# Patient Record
Sex: MALE | Race: AMERICAN INDIAN OR ALASKA NATIVE | NOT HISPANIC OR LATINO
[De-identification: names, ages, dates, MRNs, and addresses within clinical notes are randomized per-mention and may not be internally consistent; named-entity substitution may affect disease eponyms.]

---

## 2019-05-13 PROBLEM — Z00.00 ENCOUNTER FOR PREVENTIVE HEALTH EXAMINATION: Status: ACTIVE | Noted: 2019-05-13

## 2019-05-30 ENCOUNTER — RESULT REVIEW (OUTPATIENT)
Age: 64
End: 2019-05-30

## 2019-05-30 ENCOUNTER — OUTPATIENT (OUTPATIENT)
Dept: OUTPATIENT SERVICES | Facility: HOSPITAL | Age: 64
LOS: 1 days | Discharge: HOME | End: 2019-05-30

## 2019-05-30 LAB
GRAM STN FLD: SIGNIFICANT CHANGE UP
SPECIMEN SOURCE: SIGNIFICANT CHANGE UP

## 2019-06-01 LAB
-  AMPICILLIN/SULBACTAM: SIGNIFICANT CHANGE UP
-  AMPICILLIN/SULBACTAM: SIGNIFICANT CHANGE UP
-  AMPICILLIN: SIGNIFICANT CHANGE UP
-  AMPICILLIN: SIGNIFICANT CHANGE UP
-  CEFAZOLIN: SIGNIFICANT CHANGE UP
-  CEFAZOLIN: SIGNIFICANT CHANGE UP
-  CEFTRIAXONE: SIGNIFICANT CHANGE UP
-  CEFTRIAXONE: SIGNIFICANT CHANGE UP
-  GENTAMICIN: SIGNIFICANT CHANGE UP
-  GENTAMICIN: SIGNIFICANT CHANGE UP
-  PIPERACILLIN/TAZOBACTAM: SIGNIFICANT CHANGE UP
-  PIPERACILLIN/TAZOBACTAM: SIGNIFICANT CHANGE UP
-  TOBRAMYCIN: SIGNIFICANT CHANGE UP
-  TOBRAMYCIN: SIGNIFICANT CHANGE UP
-  TRIMETHOPRIM/SULFAMETHOXAZOLE: SIGNIFICANT CHANGE UP
-  TRIMETHOPRIM/SULFAMETHOXAZOLE: SIGNIFICANT CHANGE UP
CULTURE RESULTS: SIGNIFICANT CHANGE UP
METHOD TYPE: SIGNIFICANT CHANGE UP
METHOD TYPE: SIGNIFICANT CHANGE UP
ORGANISM # SPEC MICROSCOPIC CNT: SIGNIFICANT CHANGE UP
SPECIMEN SOURCE: SIGNIFICANT CHANGE UP

## 2019-06-10 LAB — SURGICAL PATHOLOGY STUDY: SIGNIFICANT CHANGE UP

## 2019-06-11 DIAGNOSIS — J33.8 OTHER POLYP OF SINUS: ICD-10-CM

## 2019-06-11 DIAGNOSIS — J32.4 CHRONIC PANSINUSITIS: ICD-10-CM

## 2019-06-11 DIAGNOSIS — J34.2 DEVIATED NASAL SEPTUM: ICD-10-CM

## 2019-06-11 DIAGNOSIS — E78.5 HYPERLIPIDEMIA, UNSPECIFIED: ICD-10-CM

## 2019-06-11 DIAGNOSIS — J34.89 OTHER SPECIFIED DISORDERS OF NOSE AND NASAL SINUSES: ICD-10-CM

## 2019-06-11 DIAGNOSIS — G47.33 OBSTRUCTIVE SLEEP APNEA (ADULT) (PEDIATRIC): ICD-10-CM

## 2019-06-11 DIAGNOSIS — Z87.891 PERSONAL HISTORY OF NICOTINE DEPENDENCE: ICD-10-CM

## 2019-06-11 DIAGNOSIS — I25.10 ATHEROSCLEROTIC HEART DISEASE OF NATIVE CORONARY ARTERY WITHOUT ANGINA PECTORIS: ICD-10-CM

## 2019-06-11 DIAGNOSIS — I10 ESSENTIAL (PRIMARY) HYPERTENSION: ICD-10-CM

## 2019-06-11 DIAGNOSIS — J44.9 CHRONIC OBSTRUCTIVE PULMONARY DISEASE, UNSPECIFIED: ICD-10-CM

## 2019-06-11 DIAGNOSIS — Z79.82 LONG TERM (CURRENT) USE OF ASPIRIN: ICD-10-CM

## 2019-06-11 DIAGNOSIS — J34.3 HYPERTROPHY OF NASAL TURBINATES: ICD-10-CM

## 2019-06-11 DIAGNOSIS — Z86.73 PERSONAL HISTORY OF TRANSIENT ISCHEMIC ATTACK (TIA), AND CEREBRAL INFARCTION WITHOUT RESIDUAL DEFICITS: ICD-10-CM

## 2019-12-04 ENCOUNTER — INPATIENT (INPATIENT)
Facility: HOSPITAL | Age: 64
LOS: 1 days | Discharge: HOME | DRG: 313 | End: 2019-12-06
Attending: STUDENT IN AN ORGANIZED HEALTH CARE EDUCATION/TRAINING PROGRAM | Admitting: STUDENT IN AN ORGANIZED HEALTH CARE EDUCATION/TRAINING PROGRAM
Payer: COMMERCIAL

## 2019-12-04 VITALS
WEIGHT: 169.98 LBS | HEIGHT: 68 IN | TEMPERATURE: 98 F | OXYGEN SATURATION: 100 % | DIASTOLIC BLOOD PRESSURE: 82 MMHG | HEART RATE: 96 BPM | SYSTOLIC BLOOD PRESSURE: 130 MMHG | RESPIRATION RATE: 16 BRPM

## 2019-12-04 DIAGNOSIS — R07.9 CHEST PAIN, UNSPECIFIED: ICD-10-CM

## 2019-12-04 LAB
ALBUMIN SERPL ELPH-MCNC: 3.2 G/DL — LOW (ref 3.5–5)
ALP SERPL-CCNC: 125 U/L — HIGH (ref 40–120)
ALT FLD-CCNC: 58 U/L DA — SIGNIFICANT CHANGE UP (ref 10–60)
ANION GAP SERPL CALC-SCNC: 8 MMOL/L — SIGNIFICANT CHANGE UP (ref 5–17)
APTT BLD: 29 SEC — SIGNIFICANT CHANGE UP (ref 27.5–36.3)
AST SERPL-CCNC: 28 U/L — SIGNIFICANT CHANGE UP (ref 10–40)
BILIRUB SERPL-MCNC: 0.5 MG/DL — SIGNIFICANT CHANGE UP (ref 0.2–1.2)
BUN SERPL-MCNC: 14 MG/DL — SIGNIFICANT CHANGE UP (ref 7–18)
CALCIUM SERPL-MCNC: 8.4 MG/DL — SIGNIFICANT CHANGE UP (ref 8.4–10.5)
CHLORIDE SERPL-SCNC: 101 MMOL/L — SIGNIFICANT CHANGE UP (ref 96–108)
CO2 SERPL-SCNC: 28 MMOL/L — SIGNIFICANT CHANGE UP (ref 22–31)
CREAT SERPL-MCNC: 0.98 MG/DL — SIGNIFICANT CHANGE UP (ref 0.5–1.3)
GLUCOSE SERPL-MCNC: 110 MG/DL — HIGH (ref 70–99)
HCT VFR BLD CALC: 39.6 % — SIGNIFICANT CHANGE UP (ref 39–50)
HGB BLD-MCNC: 13.3 G/DL — SIGNIFICANT CHANGE UP (ref 13–17)
INR BLD: 1.09 RATIO — SIGNIFICANT CHANGE UP (ref 0.88–1.16)
MAGNESIUM SERPL-MCNC: 2.4 MG/DL — SIGNIFICANT CHANGE UP (ref 1.6–2.6)
MCHC RBC-ENTMCNC: 27.1 PG — SIGNIFICANT CHANGE UP (ref 27–34)
MCHC RBC-ENTMCNC: 33.6 GM/DL — SIGNIFICANT CHANGE UP (ref 32–36)
MCV RBC AUTO: 80.8 FL — SIGNIFICANT CHANGE UP (ref 80–100)
NRBC # BLD: 0 /100 WBCS — SIGNIFICANT CHANGE UP (ref 0–0)
NT-PROBNP SERPL-SCNC: 16 PG/ML — SIGNIFICANT CHANGE UP (ref 0–125)
PLATELET # BLD AUTO: 384 K/UL — SIGNIFICANT CHANGE UP (ref 150–400)
POTASSIUM SERPL-MCNC: 3.2 MMOL/L — LOW (ref 3.5–5.3)
POTASSIUM SERPL-SCNC: 3.2 MMOL/L — LOW (ref 3.5–5.3)
PROT SERPL-MCNC: 7.7 G/DL — SIGNIFICANT CHANGE UP (ref 6–8.3)
PROTHROM AB SERPL-ACNC: 12.1 SEC — SIGNIFICANT CHANGE UP (ref 10–12.9)
RBC # BLD: 4.9 M/UL — SIGNIFICANT CHANGE UP (ref 4.2–5.8)
RBC # FLD: 14.8 % — HIGH (ref 10.3–14.5)
SODIUM SERPL-SCNC: 137 MMOL/L — SIGNIFICANT CHANGE UP (ref 135–145)
TROPONIN I SERPL-MCNC: <0.015 NG/ML — SIGNIFICANT CHANGE UP (ref 0–0.04)
WBC # BLD: 9.05 K/UL — SIGNIFICANT CHANGE UP (ref 3.8–10.5)
WBC # FLD AUTO: 9.05 K/UL — SIGNIFICANT CHANGE UP (ref 3.8–10.5)

## 2019-12-04 PROCEDURE — 71046 X-RAY EXAM CHEST 2 VIEWS: CPT | Mod: 26

## 2019-12-04 PROCEDURE — 99285 EMERGENCY DEPT VISIT HI MDM: CPT

## 2019-12-04 PROCEDURE — 93010 ELECTROCARDIOGRAM REPORT: CPT

## 2019-12-04 NOTE — ED PROVIDER NOTE - OBJECTIVE STATEMENT
64 year old male PMH HTN, HLD, CVA (no residual deficits) coming in with off balance sensation while walking as well as left sided nonradiating cp with associated SOB for the past day. Pt states was seen at another hospital in [place] and had a CTA head/neck (has printed results) which only showed b/l carotid stenosis and he was advised to f/u with PMD/vascular surgery. Pt Denies numbness, tingling, weakness, ha, vision changes, fevers, chills, sweats, abd pains, N/V/D/C, urinary complaints. nonsmoker. +fam hx cardiac disease.

## 2019-12-04 NOTE — ED ADULT NURSE NOTE - OBJECTIVE STATEMENT
Pt hx. HTN and HLD c/o Lt side chest pain on and off non radiated. Denies SOB, nausea or vomiting. non diaphoretic.

## 2019-12-04 NOTE — ED PROVIDER NOTE - CLINICAL SUMMARY MEDICAL DECISION MAKING FREE TEXT BOX
64 year old male with cp and feeling off balance while walking. vitals WNL. PE as above.  ecg NSR, RRR, no st elevations, normal intervals, no acute ischemic changes.   labs are unremarkable. cxr is unremarkable.  given risk factors and pt subjectively feels off balance while walking with admit for r/o ACS and r/o CVA. 64 year old male with cp and feeling off balance while walking. vitals WNL. PE as above.  ecg NSR, RRR, no st elevations, normal intervals, no acute ischemic changes.   labs are unremarkable. cxr is unremarkable.  given risk factors and pt subjectively feels off balance while walking with admit for r/o ACS and r/o CVA. passed dysphagia screen.

## 2019-12-05 ENCOUNTER — TRANSCRIPTION ENCOUNTER (OUTPATIENT)
Age: 64
End: 2019-12-05

## 2019-12-05 DIAGNOSIS — I65.29 OCCLUSION AND STENOSIS OF UNSPECIFIED CAROTID ARTERY: ICD-10-CM

## 2019-12-05 DIAGNOSIS — R07.9 CHEST PAIN, UNSPECIFIED: ICD-10-CM

## 2019-12-05 DIAGNOSIS — R26.81 UNSTEADINESS ON FEET: ICD-10-CM

## 2019-12-05 DIAGNOSIS — E87.6 HYPOKALEMIA: ICD-10-CM

## 2019-12-05 DIAGNOSIS — Z29.9 ENCOUNTER FOR PROPHYLACTIC MEASURES, UNSPECIFIED: ICD-10-CM

## 2019-12-05 DIAGNOSIS — E78.5 HYPERLIPIDEMIA, UNSPECIFIED: ICD-10-CM

## 2019-12-05 DIAGNOSIS — I10 ESSENTIAL (PRIMARY) HYPERTENSION: ICD-10-CM

## 2019-12-05 LAB
ALBUMIN SERPL ELPH-MCNC: 3.2 G/DL — LOW (ref 3.5–5)
ALP SERPL-CCNC: 124 U/L — HIGH (ref 40–120)
ALT FLD-CCNC: 53 U/L DA — SIGNIFICANT CHANGE UP (ref 10–60)
ANION GAP SERPL CALC-SCNC: 5 MMOL/L — SIGNIFICANT CHANGE UP (ref 5–17)
AST SERPL-CCNC: 20 U/L — SIGNIFICANT CHANGE UP (ref 10–40)
BASOPHILS # BLD AUTO: 0.04 K/UL — SIGNIFICANT CHANGE UP (ref 0–0.2)
BASOPHILS NFR BLD AUTO: 0.4 % — SIGNIFICANT CHANGE UP (ref 0–2)
BILIRUB SERPL-MCNC: 0.8 MG/DL — SIGNIFICANT CHANGE UP (ref 0.2–1.2)
BUN SERPL-MCNC: 12 MG/DL — SIGNIFICANT CHANGE UP (ref 7–18)
CALCIUM SERPL-MCNC: 9.1 MG/DL — SIGNIFICANT CHANGE UP (ref 8.4–10.5)
CHLORIDE SERPL-SCNC: 103 MMOL/L — SIGNIFICANT CHANGE UP (ref 96–108)
CHOLEST SERPL-MCNC: 129 MG/DL — SIGNIFICANT CHANGE UP (ref 10–199)
CK MB BLD-MCNC: 1.7 % — SIGNIFICANT CHANGE UP (ref 0–3.5)
CK MB CFR SERPL CALC: 2.6 NG/ML — SIGNIFICANT CHANGE UP (ref 0–3.6)
CK SERPL-CCNC: 149 U/L — SIGNIFICANT CHANGE UP (ref 35–232)
CO2 SERPL-SCNC: 30 MMOL/L — SIGNIFICANT CHANGE UP (ref 22–31)
CREAT SERPL-MCNC: 0.81 MG/DL — SIGNIFICANT CHANGE UP (ref 0.5–1.3)
EOSINOPHIL # BLD AUTO: 0.07 K/UL — SIGNIFICANT CHANGE UP (ref 0–0.5)
EOSINOPHIL NFR BLD AUTO: 0.8 % — SIGNIFICANT CHANGE UP (ref 0–6)
FOLATE SERPL-MCNC: >20 NG/ML — SIGNIFICANT CHANGE UP
GLUCOSE SERPL-MCNC: 114 MG/DL — HIGH (ref 70–99)
HBA1C BLD-MCNC: 6.2 % — HIGH (ref 4–5.6)
HCT VFR BLD CALC: 40.6 % — SIGNIFICANT CHANGE UP (ref 39–50)
HCV AB S/CO SERPL IA: 0.19 S/CO — SIGNIFICANT CHANGE UP (ref 0–0.99)
HCV AB SERPL-IMP: SIGNIFICANT CHANGE UP
HDLC SERPL-MCNC: 44 MG/DL — SIGNIFICANT CHANGE UP
HGB BLD-MCNC: 13.3 G/DL — SIGNIFICANT CHANGE UP (ref 13–17)
IMM GRANULOCYTES NFR BLD AUTO: 0.4 % — SIGNIFICANT CHANGE UP (ref 0–1.5)
LIPID PNL WITH DIRECT LDL SERPL: 69 MG/DL — SIGNIFICANT CHANGE UP
LYMPHOCYTES # BLD AUTO: 1.99 K/UL — SIGNIFICANT CHANGE UP (ref 1–3.3)
LYMPHOCYTES # BLD AUTO: 22.4 % — SIGNIFICANT CHANGE UP (ref 13–44)
MAGNESIUM SERPL-MCNC: 2.8 MG/DL — HIGH (ref 1.6–2.6)
MCHC RBC-ENTMCNC: 26.9 PG — LOW (ref 27–34)
MCHC RBC-ENTMCNC: 32.8 GM/DL — SIGNIFICANT CHANGE UP (ref 32–36)
MCV RBC AUTO: 82.2 FL — SIGNIFICANT CHANGE UP (ref 80–100)
MONOCYTES # BLD AUTO: 0.84 K/UL — SIGNIFICANT CHANGE UP (ref 0–0.9)
MONOCYTES NFR BLD AUTO: 9.4 % — SIGNIFICANT CHANGE UP (ref 2–14)
NEUTROPHILS # BLD AUTO: 5.92 K/UL — SIGNIFICANT CHANGE UP (ref 1.8–7.4)
NEUTROPHILS NFR BLD AUTO: 66.6 % — SIGNIFICANT CHANGE UP (ref 43–77)
NRBC # BLD: 0 /100 WBCS — SIGNIFICANT CHANGE UP (ref 0–0)
PHOSPHATE SERPL-MCNC: 2.9 MG/DL — SIGNIFICANT CHANGE UP (ref 2.5–4.5)
PLATELET # BLD AUTO: 402 K/UL — HIGH (ref 150–400)
POTASSIUM SERPL-MCNC: 3.8 MMOL/L — SIGNIFICANT CHANGE UP (ref 3.5–5.3)
POTASSIUM SERPL-SCNC: 3.8 MMOL/L — SIGNIFICANT CHANGE UP (ref 3.5–5.3)
PROT SERPL-MCNC: 7.4 G/DL — SIGNIFICANT CHANGE UP (ref 6–8.3)
RBC # BLD: 4.94 M/UL — SIGNIFICANT CHANGE UP (ref 4.2–5.8)
RBC # FLD: 15.1 % — HIGH (ref 10.3–14.5)
SODIUM SERPL-SCNC: 138 MMOL/L — SIGNIFICANT CHANGE UP (ref 135–145)
TOTAL CHOLESTEROL/HDL RATIO MEASUREMENT: 2.9 RATIO — LOW (ref 3.4–9.6)
TRIGL SERPL-MCNC: 82 MG/DL — SIGNIFICANT CHANGE UP (ref 10–149)
TROPONIN I SERPL-MCNC: <0.015 NG/ML — SIGNIFICANT CHANGE UP (ref 0–0.04)
TSH SERPL-MCNC: 1.07 UU/ML — SIGNIFICANT CHANGE UP (ref 0.34–4.82)
VIT B12 SERPL-MCNC: 1523 PG/ML — HIGH (ref 232–1245)
WBC # BLD: 8.9 K/UL — SIGNIFICANT CHANGE UP (ref 3.8–10.5)
WBC # FLD AUTO: 8.9 K/UL — SIGNIFICANT CHANGE UP (ref 3.8–10.5)

## 2019-12-05 PROCEDURE — 93312 ECHO TRANSESOPHAGEAL: CPT | Mod: 26

## 2019-12-05 PROCEDURE — 99222 1ST HOSP IP/OBS MODERATE 55: CPT

## 2019-12-05 PROCEDURE — 99254 IP/OBS CNSLTJ NEW/EST MOD 60: CPT | Mod: 25

## 2019-12-05 PROCEDURE — 78452 HT MUSCLE IMAGE SPECT MULT: CPT | Mod: 26

## 2019-12-05 PROCEDURE — 99255 IP/OBS CONSLTJ NEW/EST HI 80: CPT

## 2019-12-05 PROCEDURE — 93018 CV STRESS TEST I&R ONLY: CPT

## 2019-12-05 RX ORDER — AMLODIPINE BESYLATE 2.5 MG/1
1 TABLET ORAL
Qty: 0 | Refills: 0 | DISCHARGE

## 2019-12-05 RX ORDER — ATORVASTATIN CALCIUM 80 MG/1
80 TABLET, FILM COATED ORAL AT BEDTIME
Refills: 0 | Status: DISCONTINUED | OUTPATIENT
Start: 2019-12-05 | End: 2019-12-06

## 2019-12-05 RX ORDER — POTASSIUM CHLORIDE 20 MEQ
40 PACKET (EA) ORAL ONCE
Refills: 0 | Status: COMPLETED | OUTPATIENT
Start: 2019-12-05 | End: 2019-12-05

## 2019-12-05 RX ORDER — INFLUENZA VIRUS VACCINE 15; 15; 15; 15 UG/.5ML; UG/.5ML; UG/.5ML; UG/.5ML
0.5 SUSPENSION INTRAMUSCULAR ONCE
Refills: 0 | Status: COMPLETED | OUTPATIENT
Start: 2019-12-05 | End: 2019-12-05

## 2019-12-05 RX ORDER — HYDROCHLOROTHIAZIDE 25 MG
25 TABLET ORAL DAILY
Refills: 0 | Status: DISCONTINUED | OUTPATIENT
Start: 2019-12-05 | End: 2019-12-05

## 2019-12-05 RX ORDER — AMLODIPINE BESYLATE 2.5 MG/1
10 TABLET ORAL DAILY
Refills: 0 | Status: DISCONTINUED | OUTPATIENT
Start: 2019-12-05 | End: 2019-12-05

## 2019-12-05 RX ORDER — ROSUVASTATIN CALCIUM 5 MG/1
1 TABLET ORAL
Qty: 0 | Refills: 0 | DISCHARGE

## 2019-12-05 RX ORDER — METOPROLOL TARTRATE 50 MG
12.5 TABLET ORAL
Refills: 0 | Status: DISCONTINUED | OUTPATIENT
Start: 2019-12-05 | End: 2019-12-06

## 2019-12-05 RX ORDER — ASPIRIN/CALCIUM CARB/MAGNESIUM 324 MG
1 TABLET ORAL
Qty: 0 | Refills: 0 | DISCHARGE

## 2019-12-05 RX ORDER — CLOPIDOGREL BISULFATE 75 MG/1
75 TABLET, FILM COATED ORAL DAILY
Refills: 0 | Status: DISCONTINUED | OUTPATIENT
Start: 2019-12-05 | End: 2019-12-06

## 2019-12-05 RX ORDER — ENOXAPARIN SODIUM 100 MG/ML
40 INJECTION SUBCUTANEOUS DAILY
Refills: 0 | Status: DISCONTINUED | OUTPATIENT
Start: 2019-12-05 | End: 2019-12-06

## 2019-12-05 RX ORDER — LOSARTAN POTASSIUM 100 MG/1
100 TABLET, FILM COATED ORAL DAILY
Refills: 0 | Status: DISCONTINUED | OUTPATIENT
Start: 2019-12-05 | End: 2019-12-06

## 2019-12-05 RX ORDER — ASPIRIN/CALCIUM CARB/MAGNESIUM 324 MG
81 TABLET ORAL DAILY
Refills: 0 | Status: DISCONTINUED | OUTPATIENT
Start: 2019-12-05 | End: 2019-12-06

## 2019-12-05 RX ORDER — LOSARTAN POTASSIUM 100 MG/1
1 TABLET, FILM COATED ORAL
Qty: 0 | Refills: 0 | DISCHARGE

## 2019-12-05 RX ADMIN — ATORVASTATIN CALCIUM 80 MILLIGRAM(S): 80 TABLET, FILM COATED ORAL at 21:21

## 2019-12-05 RX ADMIN — CLOPIDOGREL BISULFATE 75 MILLIGRAM(S): 75 TABLET, FILM COATED ORAL at 18:02

## 2019-12-05 RX ADMIN — ENOXAPARIN SODIUM 40 MILLIGRAM(S): 100 INJECTION SUBCUTANEOUS at 18:03

## 2019-12-05 RX ADMIN — LOSARTAN POTASSIUM 100 MILLIGRAM(S): 100 TABLET, FILM COATED ORAL at 06:17

## 2019-12-05 RX ADMIN — Medication 81 MILLIGRAM(S): at 18:03

## 2019-12-05 RX ADMIN — Medication 40 MILLIEQUIVALENT(S): at 00:54

## 2019-12-05 RX ADMIN — Medication 12.5 MILLIGRAM(S): at 18:04

## 2019-12-05 RX ADMIN — Medication 12.5 MILLIGRAM(S): at 06:17

## 2019-12-05 NOTE — CONSULT NOTE ADULT - ATTENDING COMMENTS
Counseling included discussion of diagnosis and of outside hospital findings, and reiteration of importance of following up with a vascular surgeon for long-term monitoring of b/l internal carotid artery stenoses.
Thank you for the courtesy of a consultation, please contact me for any additional questions

## 2019-12-05 NOTE — H&P ADULT - NSHPPHYSICALEXAM_GEN_ALL_CORE
Vital Signs Last 24 Hrs  T(C): 36.7 (05 Dec 2019 00:12), Max: 36.7 (05 Dec 2019 00:12)  T(F): 98 (05 Dec 2019 00:12), Max: 98 (05 Dec 2019 00:12)  HR: 80 (05 Dec 2019 00:12) (80 - 96)  BP: 117/69 (05 Dec 2019 00:12) (117/69 - 130/82)  BP(mean): --  RR: 16 (05 Dec 2019 00:12) (16 - 16)  SpO2: 100% (05 Dec 2019 00:12) (100% - 100%)

## 2019-12-05 NOTE — H&P ADULT - NSICDXFAMILYHX_GEN_ALL_CORE_FT
No pertinent family history in first degree relatives FAMILY HISTORY:  FH: CAD (coronary artery disease)

## 2019-12-05 NOTE — DISCHARGE NOTE PROVIDER - NSDCCPCAREPLAN_GEN_ALL_CORE_FT
PRINCIPAL DISCHARGE DIAGNOSIS  Diagnosis: Chest pain, unspecified type  Assessment and Plan of Treatment: You presented with chest pain and were admitted to ensure no cardiac cause. Your EKG showed normal sinus rhythm and your cardiac enzymes were negative when trended. You received an ECHO which showed your ejection fraction to be >55%. A stress test was done which showed no evidence of ischemia. At this time you have no evidence of cardiac ischemia.   Please follow up with your primary care provider. You have been started on aspirin, a statin, and a beta blocker to reduce your risk of heart disease. Please take your medications as prescribed.      SECONDARY DISCHARGE DIAGNOSES  Diagnosis: Carotid stenosis, bilateral  Assessment and Plan of Treatment: Please follow with vascular surgery as outpatient. Your medications were continued throughout your stay. Please follow up with your primary care provider to ensure continued optimal management.    Diagnosis: HLD (hyperlipidemia)  Assessment and Plan of Treatment: Your cholesterol levels were found to be elevated. In order to lower your risk of cardiac and vascular disease, please take your statin as prescribed. Additionally, we recommend you carefully watch your diet to avoid fatty and greasy foods. Please follow up with your primary care provider to recheck your lipid panel every few years.    Diagnosis: HTN (hypertension)  Assessment and Plan of Treatment: Continue with blood pressure medication. Maintain a healthy diet that consist of low sugar, low fat, low sodium diet. Exercise frequently if possible.  Follow up with primary care physician in one week after discharge.    Diagnosis: Dizziness  Assessment and Plan of Treatment: Your medications were continued throughout your stay. Please follow up with your primary care provider to ensure continued optimal management.

## 2019-12-05 NOTE — H&P ADULT - PROBLEM SELECTOR PLAN 3
recent CTA head and neck do not show any remarkable findings that could explain gait dysfunction  will get neuro eval  fall precautions

## 2019-12-05 NOTE — H&P ADULT - PROBLEM SELECTOR PLAN 1
p/w left sided chest pain with some typical features x2 days  EKG on admission shows NSR with no STTW abnormalities  trop negative x1, f/u t2 and t3  f/u TTE  tele monitoring  asa, statin, bblocker  cardio consult - Dr. Antonio

## 2019-12-05 NOTE — H&P ADULT - NSICDXPASTMEDICALHX_GEN_ALL_CORE_FT
PAST MEDICAL HISTORY:  CVA (cerebrovascular accident) no residual deficits    H/O carotid artery stenosis     HLD (hyperlipidemia)     HTN (hypertension)

## 2019-12-05 NOTE — DISCHARGE NOTE PROVIDER - NSDCMRMEDTOKEN_GEN_ALL_CORE_FT
aspirin 81 mg oral tablet, chewable: 1 tab(s) orally once a day  Cozaar 100 mg oral tablet: 1 tab(s) orally once a day  hydroCHLOROthiazide 25 mg oral tablet: 1 tab(s) orally once a day  Norvasc 10 mg oral tablet: 1 tab(s) orally once a day  rosuvastatin 20 mg oral tablet: 1 tab(s) orally once a day aspirin 81 mg oral tablet, chewable: 1 tab(s) orally once a day  clopidogrel 75 mg oral tablet: 1 tab(s) orally once a day  Cozaar 100 mg oral tablet: 1 tab(s) orally once a day  hydroCHLOROthiazide 25 mg oral tablet: 1 tab(s) orally once a day  Norvasc 10 mg oral tablet: 1 tab(s) orally once a day  rosuvastatin 20 mg oral tablet: 1 tab(s) orally once a day

## 2019-12-05 NOTE — H&P ADULT - PROBLEM SELECTOR PLAN 4
left proximal ICA has 50% stenosis, right proximal ICA with 75% stenosis  not symptomatic at this time  outpatient vascular f/u as previously scheduled

## 2019-12-05 NOTE — CONSULT NOTE ADULT - SUBJECTIVE AND OBJECTIVE BOX
NOTE TO BE COMPLETED - PLEASE REFER TO ABOVE ONLY AND IGNORE INFORMATION BELOW        NEUROLOGY CONSULT NOTE    NAME:  NOLA MARTINEZ    CHIEF COMPLAINT:  Patient is a 64y old  Male who presents with a chief complaint of chest pain (05 Dec 2019 13:27)      HPI:  65 y/o male with PMHx of HTN, HLD and CVA (5 years ago, no residual deficits) presents to the ED with chief complaint of left sided chest pain x1 day. Patient describes the pain as nonradiating, 9/10 in severity at its worse, nonpleuritic, better with rest and improved with aspirin. He says the pain comes and goes. He also complains of intermittent unsteadiness for the past week. Patient was recently admitted at [facility] in [place] for the same unsteadiness and was found ot have bilateral carotid artery stenosis (left proximal ICA: 50% stenosis, right proximal ICA: 75% stenosis). Patient was discharged with instructions to follow up with vascular surgery outpatient, which he is supposed to do later this month.    Patient provides CTA head and neck report dated 11/28/19 from [facility] confirming the carotid artery stenoses as described above. (05 Dec 2019 00:32)      NEURO HPI:      PAST MEDICAL & SURGICAL HISTORY:  HTN (hypertension)  CVA (cerebrovascular accident): no residual deficits  HLD (hyperlipidemia)  H/O carotid artery stenosis  No significant past surgical history      MEDICATIONS:  aspirin  chewable 81 milliGRAM(s) Oral daily  atorvastatin 80 milliGRAM(s) Oral at bedtime  clopidogrel Tablet 75 milliGRAM(s) Oral daily  enoxaparin Injectable 40 milliGRAM(s) SubCutaneous daily  influenza   Vaccine 0.5 milliLiter(s) IntraMuscular once  losartan 100 milliGRAM(s) Oral daily  metoprolol tartrate 12.5 milliGRAM(s) Oral two times a day      ALLERGIES:  No Known Allergies      FAMILY HISTORY:  FH: CAD (coronary artery disease)      SOCIAL HISTORY:  Denies alcohol, tobacco, and illicit drug use    REVIEW OF SYSTEMS:  GENERAL: No fever, weight changes, fatigue  EYES: No eye pain or discharge  EAR/NOSE/MOUTH/THROAT: No sinus or throat pain; No difficulty hearing  NECK: No pain or stiffness  RESPIRATORY: No cough, wheezing, chills, or hemoptysis  CARDIOVASCULAR: No chest pain, palpitations, shortness of breath, or dyspnea on exertion  GASTROINTESTINAL: No abdominal pain, nausea, vomiting, hematemesis, diarrhea, or constipation  GENITOURINARY: No dysuria, frequency, hematuria, or incontinence  SKIN: No rashes or lesions  ENDOCRINE: No heat or cold intolerance  HEMATOLOGIC: No easy bruising or bleeding  PSYCHIATRIC: No depression, anxiety, or mood swings  MUSCULOSKELETAL: No joint pain or swelling  NEUROLOGICAL: As per HPI      OBJECTIVE:    Vital Signs Last 24 Hrs  T(C): 37.3 (05 Dec 2019 15:39), Max: 37.3 (05 Dec 2019 15:39)  T(F): 99.2 (05 Dec 2019 15:39), Max: 99.2 (05 Dec 2019 15:39)  HR: 89 (05 Dec 2019 15:39) (78 - 96)  BP: 151/76 (05 Dec 2019 15:39) (99/67 - 151/76)  BP(mean): --  RR: 18 (05 Dec 2019 15:39) (16 - 19)  SpO2: 99% (05 Dec 2019 15:39) (97% - 100%)    General Examination:  General: No acute distress  HEENT: Atraumatic, Normocephalic  Respiratory: CTA B/l.  No crackles, rhonchi, or wheezes.  Cardiovascular: RRR.  Normal S1 & S2.  Normal b/l radial and pedal pulses.    Neurological Examination:  General / Mental Status: AAO x 3.  No aphasia or dysarthria.  Naming and repetition intact.  Cranial Nerves: VFF x 4.  PERRL.  EOMI x 2, No nystagmus or diplopia.  B/l V1-V3 equal and intact to light touch and pinprick.  Symmetric facial movement and palate elevation.  B/l hearing equal to finger rub.  5/5 strength with b/l sternocleidomastoid & trapezius.  Midline tongue protrusion, with no atrophy or fasciculations.  Motor: Normal bulk & tone in all four extremities.  5/5 strength throughout all four extremities.  No downward drift, rigidity, spasticity, or tremors in any of the four extremities.  Sensory: Intact to light touch and pinprick in all four extremities.  Negative Romberg.  Reflex: 2+ and symmetric at b/l biceps, triceps, brachioradialis, patellae, and ankles.  Downgoing toes b/l.  Coordination: No dysmetria with b/l finger-to-nose and heel raise tests.  Symmetric rapid alternating movements b/l.  Gait: Normal, narrow-based gait.  No difficulty with tiptoe, heel, and tandem gaits.      Laboratory Values:    CBC Full  -  ( 05 Dec 2019 07:18 )  WBC Count : 8.90 K/uL  RBC Count : 4.94 M/uL  Hemoglobin : 13.3 g/dL  Hematocrit : 40.6 %  Platelet Count - Automated : 402 K/uL  Mean Cell Volume : 82.2 fl  Mean Cell Hemoglobin : 26.9 pg  Mean Cell Hemoglobin Concentration : 32.8 gm/dL  Auto Neutrophil # : 5.92 K/uL  Auto Lymphocyte # : 1.99 K/uL  Auto Monocyte # : 0.84 K/uL  Auto Eosinophil # : 0.07 K/uL  Auto Basophil # : 0.04 K/uL  Auto Neutrophil % : 66.6 %  Auto Lymphocyte % : 22.4 %  Auto Monocyte % : 9.4 %  Auto Eosinophil % : 0.8 %  Auto Basophil % : 0.4 %      12-05    138  |  103  |  12  ----------------------------<  114<H>  3.8   |  30  |  0.81    Ca    9.1      05 Dec 2019 07:18  Phos  2.9     12-05  Mg     2.8     12-05    TPro  7.4  /  Alb  3.2<L>  /  TBili  0.8  /  DBili  x   /  AST  20  /  ALT  53  /  AlkPhos  124<H>  12-05    LIVER FUNCTIONS - ( 05 Dec 2019 07:18 )  Alb: 3.2 g/dL / Pro: 7.4 g/dL / ALK PHOS: 124 U/L / ALT: 53 U/L DA / AST: 20 U/L / GGT: x             12-05 Chol 129 LDL 69 HDL 44 Trig 82    12-05 [id number] 6.2              Neuroimaging:      Please contact the Neurology consult service with any questions.    Gil Dong MD   of Neurology  [facility] at [facility] Patient was reported to have unsteady gait at home, and had workup at [facility], which found b/l ICA stenosis  Current neuro exam is normal: No nystagmus, Negative Orting-Hallpike b/l, Negative Romberg, No difficulty with normal and specialized gaits    Dx: Carotid insufficiency    Recs:  - No further inpatient neurological testing  - Follow up with Vascular surgery for evaluation of b/l ICA stenoses as scheduled (Dr. Rae in [place])  - Follow up in Neurology clinic as needed          NOTE TO BE COMPLETED - PLEASE REFER TO ABOVE ONLY AND IGNORE INFORMATION BELOW        NEUROLOGY CONSULT NOTE    NAME:  NOLA MARTINEZ    CHIEF COMPLAINT:  Patient is a 64y old  Male who presents with a chief complaint of chest pain (05 Dec 2019 13:27)      HPI:  63 y/o male with PMHx of HTN, HLD and CVA (5 years ago, no residual deficits) presents to the ED with chief complaint of left sided chest pain x1 day. Patient describes the pain as nonradiating, 9/10 in severity at its worse, nonpleuritic, better with rest and improved with aspirin. He says the pain comes and goes. He also complains of intermittent unsteadiness for the past week. Patient was recently admitted at [facility] in [place] for the same unsteadiness and was found ot have bilateral carotid artery stenosis (left proximal ICA: 50% stenosis, right proximal ICA: 75% stenosis). Patient was discharged with instructions to follow up with vascular surgery outpatient, which he is supposed to do later this month.    Patient provides CTA head and neck report dated 11/28/19 from [facility] confirming the carotid artery stenoses as described above. (05 Dec 2019 00:32)      NEURO HPI:      PAST MEDICAL & SURGICAL HISTORY:  HTN (hypertension)  CVA (cerebrovascular accident): no residual deficits  HLD (hyperlipidemia)  H/O carotid artery stenosis  No significant past surgical history      MEDICATIONS:  aspirin  chewable 81 milliGRAM(s) Oral daily  atorvastatin 80 milliGRAM(s) Oral at bedtime  clopidogrel Tablet 75 milliGRAM(s) Oral daily  enoxaparin Injectable 40 milliGRAM(s) SubCutaneous daily  influenza   Vaccine 0.5 milliLiter(s) IntraMuscular once  losartan 100 milliGRAM(s) Oral daily  metoprolol tartrate 12.5 milliGRAM(s) Oral two times a day      ALLERGIES:  No Known Allergies      FAMILY HISTORY:  FH: CAD (coronary artery disease)      SOCIAL HISTORY:  Denies alcohol, tobacco, and illicit drug use    REVIEW OF SYSTEMS:  GENERAL: No fever, weight changes, fatigue  EYES: No eye pain or discharge  EAR/NOSE/MOUTH/THROAT: No sinus or throat pain; No difficulty hearing  NECK: No pain or stiffness  RESPIRATORY: No cough, wheezing, chills, or hemoptysis  CARDIOVASCULAR: No chest pain, palpitations, shortness of breath, or dyspnea on exertion  GASTROINTESTINAL: No abdominal pain, nausea, vomiting, hematemesis, diarrhea, or constipation  GENITOURINARY: No dysuria, frequency, hematuria, or incontinence  SKIN: No rashes or lesions  ENDOCRINE: No heat or cold intolerance  HEMATOLOGIC: No easy bruising or bleeding  PSYCHIATRIC: No depression, anxiety, or mood swings  MUSCULOSKELETAL: No joint pain or swelling  NEUROLOGICAL: As per HPI      OBJECTIVE:    Vital Signs Last 24 Hrs  T(C): 37.3 (05 Dec 2019 15:39), Max: 37.3 (05 Dec 2019 15:39)  T(F): 99.2 (05 Dec 2019 15:39), Max: 99.2 (05 Dec 2019 15:39)  HR: 89 (05 Dec 2019 15:39) (78 - 96)  BP: 151/76 (05 Dec 2019 15:39) (99/67 - 151/76)  BP(mean): --  RR: 18 (05 Dec 2019 15:39) (16 - 19)  SpO2: 99% (05 Dec 2019 15:39) (97% - 100%)    General Examination:  General: No acute distress  HEENT: Atraumatic, Normocephalic  Respiratory: CTA B/l.  No crackles, rhonchi, or wheezes.  Cardiovascular: RRR.  Normal S1 & S2.  Normal b/l radial and pedal pulses.    Neurological Examination:  General / Mental Status: AAO x 3.  No aphasia or dysarthria.  Naming and repetition intact.  Cranial Nerves: VFF x 4.  PERRL.  EOMI x 2, No nystagmus or diplopia.  B/l V1-V3 equal and intact to light touch and pinprick.  Symmetric facial movement and palate elevation.  B/l hearing equal to finger rub.  5/5 strength with b/l sternocleidomastoid & trapezius.  Midline tongue protrusion, with no atrophy or fasciculations.  Motor: Normal bulk & tone in all four extremities.  5/5 strength throughout all four extremities.  No downward drift, rigidity, spasticity, or tremors in any of the four extremities.  Sensory: Intact to light touch and pinprick in all four extremities.  Negative Romberg.  Reflex: 2+ and symmetric at b/l biceps, triceps, brachioradialis, patellae, and ankles.  Downgoing toes b/l.  Coordination: No dysmetria with b/l finger-to-nose and heel raise tests.  Symmetric rapid alternating movements b/l.  Gait: Normal, narrow-based gait.  No difficulty with tiptoe, heel, and tandem gaits.      Laboratory Values:    CBC Full  -  ( 05 Dec 2019 07:18 )  WBC Count : 8.90 K/uL  RBC Count : 4.94 M/uL  Hemoglobin : 13.3 g/dL  Hematocrit : 40.6 %  Platelet Count - Automated : 402 K/uL  Mean Cell Volume : 82.2 fl  Mean Cell Hemoglobin : 26.9 pg  Mean Cell Hemoglobin Concentration : 32.8 gm/dL  Auto Neutrophil # : 5.92 K/uL  Auto Lymphocyte # : 1.99 K/uL  Auto Monocyte # : 0.84 K/uL  Auto Eosinophil # : 0.07 K/uL  Auto Basophil # : 0.04 K/uL  Auto Neutrophil % : 66.6 %  Auto Lymphocyte % : 22.4 %  Auto Monocyte % : 9.4 %  Auto Eosinophil % : 0.8 %  Auto Basophil % : 0.4 %      12-05    138  |  103  |  12  ----------------------------<  114<H>  3.8   |  30  |  0.81    Ca    9.1      05 Dec 2019 07:18  Phos  2.9     12-05  Mg     2.8     12-05    TPro  7.4  /  Alb  3.2<L>  /  TBili  0.8  /  DBili  x   /  AST  20  /  ALT  53  /  AlkPhos  124<H>  12-05    LIVER FUNCTIONS - ( 05 Dec 2019 07:18 )  Alb: 3.2 g/dL / Pro: 7.4 g/dL / ALK PHOS: 124 U/L / ALT: 53 U/L DA / AST: 20 U/L / GGT: x             12-05 Chol 129 LDL 69 HDL 44 Trig 82    12-05 [id number] 6.2              Neuroimaging:      Please contact the Neurology consult service with any questions.    Gil Dong MD   of Neurology  [facility] at [facility] NEUROLOGY CONSULT NOTE    NAME:  NOLA MARTINEZ    CHIEF COMPLAINT:  Patient is a 64y old  Male who presents with a chief complaint of chest pain (05 Dec 2019 13:27)    HPI:  63 y/o male with PMHx of HTN, HLD and CVA (5 years ago, no residual deficits) presents to the ED with chief complaint of left sided chest pain x1 day. Patient describes the pain as nonradiating, 9/10 in severity at its worse, nonpleuritic, better with rest and improved with aspirin. He says the pain comes and goes. He also complains of intermittent unsteadiness for the past week. Patient was recently admitted at [facility] in [place] for the same unsteadiness and was found ot have bilateral carotid artery stenosis (left proximal ICA: 50% stenosis, right proximal ICA: 75% stenosis). Patient was discharged with instructions to follow up with vascular surgery outpatient, which he is supposed to do later this month.  Patient provides CTA head and neck report dated 11/28/19 from [facility] confirming the carotid artery stenoses as described above. (05 Dec 2019 00:32)    NEURO HPI:  64 RHM patient who was reported to have unsteady gait at home, and had workup at [facility], which found b/l ICA stenosis.    PAST MEDICAL & SURGICAL HISTORY:  HTN (hypertension)  CVA (cerebrovascular accident): no residual deficits  HLD (hyperlipidemia)  H/O carotid artery stenosis  No significant past surgical history    MEDICATIONS:  aspirin  chewable 81 milliGRAM(s) Oral daily  atorvastatin 80 milliGRAM(s) Oral at bedtime  clopidogrel Tablet 75 milliGRAM(s) Oral daily  enoxaparin Injectable 40 milliGRAM(s) SubCutaneous daily  influenza   Vaccine 0.5 milliLiter(s) IntraMuscular once  losartan 100 milliGRAM(s) Oral daily  metoprolol tartrate 12.5 milliGRAM(s) Oral two times a day    ALLERGIES:  No Known Allergies    FAMILY HISTORY:  FH: CAD (coronary artery disease)    SOCIAL HISTORY:  Denies alcohol, tobacco, and illicit drug use    REVIEW OF SYSTEMS:  GENERAL: No fever, weight changes, fatigue  EYES: No eye pain or discharge  EAR/NOSE/MOUTH/THROAT: No sinus or throat pain  NECK: No pain or stiffness  RESPIRATORY: No cough, wheezing, chills, or hemoptysis  CARDIOVASCULAR: Recent chest pain; No palpitations, shortness of breath  GASTROINTESTINAL: No abdominal pain, nausea, vomiting  GENITOURINARY: No dysuria, frequency  SKIN: No rashes or lesions  ENDOCRINE: No heat or cold intolerance  HEMATOLOGIC: No easy bruising or bleeding  PSYCHIATRIC: No depression, anxiety, or mood swings  MUSCULOSKELETAL: No joint pain or swelling  NEUROLOGICAL: As per HPI      OBJECTIVE:    Vital Signs Last 24 Hrs  T(C): 37.3 (05 Dec 2019 15:39), Max: 37.3 (05 Dec 2019 15:39)  T(F): 99.2 (05 Dec 2019 15:39), Max: 99.2 (05 Dec 2019 15:39)  HR: 89 (05 Dec 2019 15:39) (78 - 96)  BP: 151/76 (05 Dec 2019 15:39) (99/67 - 151/76)  RR: 18 (05 Dec 2019 15:39) (16 - 19)  SpO2: 99% (05 Dec 2019 15:39) (97% - 100%)    General Examination:  General: No acute distress  HEENT: Atraumatic, Normocephalic  Respiratory: CTA B/l.  No crackles, rhonchi, or wheezes.  Cardiovascular: RRR.  Normal S1 & S2.  Normal b/l radial and pedal pulses.    Neurological Examination:  General / Mental Status: AAO x 3.  No aphasia or dysarthria.  Naming and repetition intact.  Cranial Nerves: VFF x 4.  PERRL.  EOMI x 2, No nystagmus or diplopia.  B/l V1-V3 equal and intact to light touch and pinprick.  Symmetric facial movement and palate elevation.  B/l hearing equal to finger rub.  Negative Palmer-Hallpike maneuver b/l.  5/5 strength with b/l sternocleidomastoid & trapezius.  Midline tongue protrusion, with no atrophy or fasciculations.  Motor: Normal bulk & tone in all four extremities.  5/5 strength throughout all four extremities.  No downward drift, rigidity, spasticity, or tremors in any of the four extremities.  Sensory: Intact to light touch and pinprick in all four extremities.  Reflex: 2+ and symmetric at b/l biceps, triceps, brachioradialis, patellae, and ankles.  Downgoing toes b/l.  Coordination: No dysmetria with b/l finger-to-nose and heel raise tests.  Symmetric rapid alternating movements b/l.  Gait and Romberg testing deferred per patient request.      Laboratory Values:    CBC Full  -  ( 05 Dec 2019 07:18 )  WBC Count : 8.90 K/uL  RBC Count : 4.94 M/uL  Hemoglobin : 13.3 g/dL  Hematocrit : 40.6 %  Platelet Count - Automated : 402 K/uL  Mean Cell Volume : 82.2 fl  Mean Cell Hemoglobin : 26.9 pg  Mean Cell Hemoglobin Concentration : 32.8 gm/dL  Auto Neutrophil # : 5.92 K/uL  Auto Lymphocyte # : 1.99 K/uL  Auto Monocyte # : 0.84 K/uL  Auto Eosinophil # : 0.07 K/uL  Auto Basophil # : 0.04 K/uL  Auto Neutrophil % : 66.6 %  Auto Lymphocyte % : 22.4 %  Auto Monocyte % : 9.4 %  Auto Eosinophil % : 0.8 %  Auto Basophil % : 0.4 %      12-05    138  |  103  |  12  ----------------------------<  114<H>  3.8   |  30  |  0.81    Ca    9.1      05 Dec 2019 07:18  Phos  2.9     12-05  Mg     2.8     12-05    TPro  7.4  /  Alb  3.2<L>  /  TBili  0.8  /  DBili  x   /  AST  20  /  ALT  53  /  AlkPhos  124<H>  12-05    LIVER FUNCTIONS - ( 05 Dec 2019 07:18 )  Alb: 3.2 g/dL / Pro: 7.4 g/dL / ALK PHOS: 124 U/L / ALT: 53 U/L DA / AST: 20 U/L / GGT: x             12-05 Chol 129 LDL 69 HDL 44 Trig 82    12-05 [id number] 6.2      Imaging:    Outside Carotid Doppler report as per HPI.    Echo (12/5/19):  - LVEF > 55%  - Trace tricuspid and pulmonic regurgitation  - No cardiac thrombus or intracardiac shunt identified    No recent Neuroimaging.      Assessment:  64 RHM with recent unsteadiness with reported b/l internal carotid artery stenoses at outside hospital, concerning for carotid insufficiency, but not likely to represent transient ischemic attack, stroke, or seizure, now resolved.      Recommendations:    - No further inpatient neurological testing given normal neurological examination, including gait and balance testing    - Follow up with Vascular surgery for evaluation of b/l ICA stenoses as scheduled (Dr. Rae in [place])    - Follow up in Neurology clinic as needed      Please contact the Neurology consult service with any questions.    Gil Dong MD   of Neurology  [facility] at [facility]

## 2019-12-05 NOTE — H&P ADULT - ATTENDING COMMENTS
Patient seen and examined ; case was discussed with the admitting resident    ROS: as in the HPI; all other ROS negative    SH and family history as above    Vital Signs Last 24 Hrs  T(C): 36.7 (05 Dec 2019 00:12), Max: 36.7 (05 Dec 2019 00:12)  T(F): 98 (05 Dec 2019 00:12), Max: 98 (05 Dec 2019 00:12)  HR: 80 (05 Dec 2019 00:12) (80 - 96)  BP: 117/69 (05 Dec 2019 00:12) (117/69 - 130/82)  BP(mean): --  RR: 16 (05 Dec 2019 00:12) (16 - 16)  SpO2: 100% (05 Dec 2019 00:12) (100% - 100%)    GEN: NAD  HEENT- normocephalic; mouth moist  CVS- S1S2+  LUNGS- clear to auscultation; no wheezing  ABD: Soft , nontender, nondistended, Bowel sounds are present  EXTREMITY: no calf tenderness, no cyanosis, no edema  NEURO: AAOx3; non focal neurologic exam; cranial nerves grossly intact  PSYCH: normal affect and behavior  BACK: no swelling or mass;   VASCULAR: ++ distal peripheral pulses  SKIN: warm and dry.       Labs Reviewed:                         13.3   9.05  )-----------( 384      ( 04 Dec 2019 21:41 )             39.6     12-04    137  |  101  |  14  ----------------------------<  110<H>  3.2<L>   |  28  |  0.98    Ca    8.4      04 Dec 2019 21:41  Mg     2.4     12-04    TPro  7.7  /  Alb  3.2<L>  /  TBili  0.5  /  DBili  x   /  AST  28  /  ALT  58  /  AlkPhos  125<H>  12-04    CARDIAC MARKERS ( 04 Dec 2019 21:41 )  <0.015 ng/mL / x     / x     / x     / x            PT/INR - ( 04 Dec 2019 21:41 )   PT: 12.1 sec;   INR: 1.09 ratio         PTT - ( 04 Dec 2019 21:41 )  PTT:29.0 sec  BNP: Serum Pro-Brain Natriuretic Peptide: 16 pg/mL (12-04 @ 21:41)    MEDICATIONS  (STANDING):  aspirin  chewable 81 milliGRAM(s) Oral daily  atorvastatin 80 milliGRAM(s) Oral at bedtime  enoxaparin Injectable 40 milliGRAM(s) SubCutaneous daily  hydrochlorothiazide 25 milliGRAM(s) Oral daily  losartan 100 milliGRAM(s) Oral daily  metoprolol tartrate 12.5 milliGRAM(s) Oral two times a day    MEDICATIONS  (PRN):      CXR reviewed:     EKG Reviewed:         Patient presenting with Patient is a 64y old  Male who presents with a chief complaint of chest pain (05 Dec 2019 00:32)   admitted for         Plan of care discussed with patient ;  all questions and concerns were addressed. Patient seen and examined ; case was discussed with the admitting resident  ROS: as in the HPI; all other ROS negative  SH and family history as above  T(C): 36.7 (05 Dec 2019 00:12), Max: 36.7 (05 Dec 2019 00:12)  T(F): 98 (05 Dec 2019 00:12), Max: 98 (05 Dec 2019 00:12)  HR: 80 (05 Dec 2019 00:12) (80 - 96)  BP: 117/69 (05 Dec 2019 00:12) (117/69 - 130/82)  BP(mean): --  RR: 16 (05 Dec 2019 00:12) (16 - 16)  SpO2: 100% (05 Dec 2019 00:12) (100% - 100%)  GEN: NAD  HEENT- normocephalic; mouth moist  Neck- no bruits appreciated   CVS- S1S2+  CHEST- L precordium tender   LUNGS- clear to auscultation; no wheezing  ABD: Soft , nontender, nondistended, Bowel sounds are present  EXTREMITY: no calf tenderness, no cyanosis, no edema  NEURO: AAOx3; non focal neurologic exam; cranial nerves grossly intact  PSYCH: normal affect and behavior  BACK: no swelling or mass;   VASCULAR: ++ distal peripheral pulses  SKIN: warm and dry.       Labs Reviewed:                         13.3   9.05  )-----------( 384      ( 04 Dec 2019 21:41 )             39.6     12-04    137  |  101  |  14  ----------------------------<  110<H>  3.2<L>   |  28  |  0.98    Ca    8.4      04 Dec 2019 21:41  Mg     2.4     12-04    TPro  7.7  /  Alb  3.2<L>  /  TBili  0.5  /  DBili  x   /  AST  28  /  ALT  58  /  AlkPhos  125<H>  12-04    CARDIAC MARKERS ( 04 Dec 2019 21:41 )  <0.015 ng/mL / x     / x     / x     / x        PT/INR - ( 04 Dec 2019 21:41 )   PT: 12.1 sec;   INR: 1.09 ratio    PTT - ( 04 Dec 2019 21:41 )  PTT:29.0 sec  BNP: Serum Pro-Brain Natriuretic Peptide: 16 pg/mL (12-04 @ 21:41)  aspirin  chewable 81 milliGRAM(s) Oral daily  atorvastatin 80 milliGRAM(s) Oral at bedtime  enoxaparin Injectable 40 milliGRAM(s) SubCutaneous daily  hydrochlorothiazide 25 milliGRAM(s) Oral daily  losartan 100 milliGRAM(s) Oral daily  metoprolol tartrate 12.5 milliGRAM(s) Oral two times a day    CXR reviewed  EKG Reviewed  Prev records reviewed       63 y/o M with b/l GARCIA, h/o CVA without residual deficits, HTN, HLD admitted with chest pain and gait unsteadiness. Recently admitted in [place] where he was found to have b/l garcia for feeling unsteady. Has not had prior LHC,  remote treadmill stress test which was reportedly normal. He denies aphasia, facial droop, amaurosis fugax, sx of decompensated chf.     1. Chest pain- some typical features with risk factors/ equivalents for CAD- monitor tele, check echo to assess underlying structural heart disease/ wma, asa, statin, bb. Appreciate cardiology recommendations.   2. Gait instability- recent CTA head/neck report reviewed, carotid artery disease does not sound symptomatic but patient still feeling unsteady on his feet and is requesting second opinion from neurology. Will request neurology consultation. Medical tx with high intensity statin, asa, plavix.   3. Carotid artery stenosis, bilateral, moderate- severe- medical tx as above    4. Hypokalemia- hold HCTZ, check Mg  5. HTN  6. HLD       Plan of care discussed with patient ;  all questions and concerns were addressed.

## 2019-12-05 NOTE — H&P ADULT - HISTORY OF PRESENT ILLNESS
63 y/o male with PMHx of HTN, HLD and CVA (5 years ago, no residual deficits) presents to the ED with chief complaint of left sided chest pain x1 day. Patient describes the pain as nonradiating, 9/10 in severity at its worse, nonpleuritic, better with rest and improved with aspirin. He says the pain comes and goes. He also complains of intermittent unsteadiness for the past week. Patient was recently admitted at [facility] in [place] for the same unsteadiness and was found ot have bilateral carotid artery stenosis (left proximal ICA: 50% stenosis, right proximal ICA: 75% stenosis). Patient was discharged with instructions to follow up with vascular surgery outpatient, which he is supposed to do later this month.    Patient provides CTA head and neck report dated 11/28/19 from [facility] confirming the carotid artery stenoses as described above.

## 2019-12-05 NOTE — H&P ADULT - ASSESSMENT
63 y/o male admitted to [facility] for left sided chest pain with concern for ACS and unsteady gait. Patient recently had CTA head and neck performed at another hospital for the same symptoms showing atherosclerotic disease, and was told to f/u with vascular outpatient. Will defer repeat imaging as symptoms are unchanged.

## 2019-12-05 NOTE — H&P ADULT - PROBLEM SELECTOR PLAN 7
IMPROVE VTE Individual Risk Assessment          RISK                                                          Points  [  ] Previous VTE                                                3  [  ] Thrombophilia                                             2  [  ] Lower limb paralysis                                   2        (unable to hold up >15 seconds)    [  ] Current Cancer                                             2         (within 6 months)  [ X ] Immobilization > 24 hrs                              1  [  ] ICU/CCU stay > 24 hours                             1  [ X ] Age > 60                                                         1    IMPROVE VTE Score: 2    lovenox subq

## 2019-12-05 NOTE — DISCHARGE NOTE PROVIDER - HOSPITAL COURSE
63 y/o male with PMHx of HTN, HLD and CVA (5 years ago, no residual deficits) presented to the ED with chief complaint of left sided chest pain x1 day. Patient described the pain as nonradiating, 9/10 in severity at its worse, nonpleuritic, better with rest and improved with aspirin. He states the pain comes and goes. He also complained of intermittent unsteadiness for the past week. Patient was recently admitted at [facility] in [place] for the same unsteadiness and was found to have bilateral carotid artery stenosis (left proximal ICA: 50% stenosis, right proximal ICA: 75% stenosis). Patient was discharged with instructions to follow up with vascular surgery outpatient, which he is supposed to do later this month.        Patient provided CTA head and neck report dated 11/28/19 from [facility] confirming the carotid artery stenoses as described above.        He was seen by cardiology and deemed to be intermediate risk for cardiac ischemia and received a stress test which was negative.         He will continue his outpatient followup with vascular surgery.

## 2019-12-05 NOTE — CONSULT NOTE ADULT - ASSESSMENT
65 yo M with HTN, HLD, and CVA who presented with chest pain.    1. Chest pain: will discuss stress test with patient  -Echo pending    2. HTN: On losartan 100, amlodipine 10, HCTZ 25 at home  -Can resume as tolerated, agree with holding off HCTZ first    3. HLD: On Rosuvastatin at home, would increase dose from 20 to 40mg given presence of carotid stenosis    4. Carotid stenosis: On aspirin, statin, pending follow up with vascular    ***Note that this is a preliminary note and any recommendations should NOT be carried out until this note is finalized. *** 65 yo M with HTN, HLD, and CVA who presented with chest pain.    1. Chest pain: Has mostly atypical features, however he has risk factors including smoking history and comorbidities, placing him at intermediate pretest probability of CAD.   -Echo pending  -Reasonable to perform NST in this situation for further ischemic stratification  ****NST shows no evidence of ischemia     2. HTN: On losartan 100, amlodipine 10, HCTZ 25 at home  -Can resume as tolerated, agree with holding off HCTZ first    3. HLD: On Rosuvastatin at home, would increase dose from 20 to 40mg given presence of carotid stenosis    4. Carotid stenosis: On aspirin, statin, pending follow up with vascular

## 2019-12-05 NOTE — CONSULT NOTE ADULT - SUBJECTIVE AND OBJECTIVE BOX
CHIEF COMPLAINT: Chest pain    HPI: 63 yo M with HTN, HLD, and CVA who presented with chest pain. Patient reports     PAST MEDICAL & SURGICAL HISTORY:  as above also H/O carotid artery stenosis (50% LICA, 75% DALIA) noted via CT angiogram    Allergies    No Known Allergies    MEDICATIONS  (STANDING):  aspirin  chewable 81 milliGRAM(s) Oral daily  atorvastatin 80 milliGRAM(s) Oral at bedtime  enoxaparin Injectable 40 milliGRAM(s) SubCutaneous daily  influenza   Vaccine 0.5 milliLiter(s) IntraMuscular once  losartan 100 milliGRAM(s) Oral daily  metoprolol tartrate 12.5 milliGRAM(s) Oral two times a day    MEDICATIONS  (PRN):      FAMILY HISTORY:  FH: CAD (coronary artery disease)    No family history of premature coronary artery disease or sudden cardiac death    SOCIAL HISTORY:  Smoking-  Alcohol-  Illicit Drug use-    REVIEW OF SYSTEMS:  Constitutional: [ ] fever, [ ]weight loss,  [ ]fatigue  Eyes: [ ] visual changes  Respiratory: [ ]shortness of breath;  [ ] cough, [ ]wheezing, [ ]chills, [ ]hemoptysis  Cardiovascular: [ ] chest pain, [ ]palpitations, [ ]dizziness,  [ ]leg swelling [ ]syncope  Gastrointestinal: [ ] abdominal pain, [ ]nausea, [ ]vomiting,  [ ]diarrhea   Genitourinary: [ ] dysuria, [ ] hematuria  Neurologic: [ ] headaches [ ] tremors  [ ] weakness [ ] lightheadedness  Skin: [ ] itching, [ ]burning, [ ] rashes  Endocrine: [ ] heat or cold intolerance  Musculoskeletal: [ ] joint pain or swelling; [ ] muscle, back, or extremity pain  Psychiatric: [ ] depression, [ ]anxiety, [ ]mood swings, or [ ]difficulty sleeping  Hematologic: [ ] easy bruising, [ ] bleeding gums       [ x] All others negative	  [ ] Unable to obtain    Vital Signs Last 24 Hrs  T(C): 36.6 (05 Dec 2019 08:07), Max: 36.8 (05 Dec 2019 04:33)  T(F): 97.8 (05 Dec 2019 08:07), Max: 98.3 (05 Dec 2019 04:33)  HR: 80 (05 Dec 2019 08:07) (78 - 96)  BP: 99/67 (05 Dec 2019 08:07) (99/67 - 140/82)  BP(mean): --  RR: 18 (05 Dec 2019 08:07) (16 - 19)  SpO2: 100% (05 Dec 2019 08:07) (97% - 100%)  I&O's Summary      PHYSICAL EXAM:  General: No acute distress  HEENT: EOMI, PERRL  Neck: Supple, No JVD  Lungs: Clear to auscultation bilaterally; No rales or wheezing  Heart: Regular rate and rhythm; No murmurs, rubs, or gallops  Abdomen: Nontender, bowel sounds present  Extremities: No clubbing, cyanosis, or edema  Nervous system:  Alert & Oriented X3, no focal deficits  Psychiatric: Normal affect  Skin: No rashes or lesions      LABS:  12-05    138  |  103  |  12  ----------------------------<  114<H>  3.8   |  30  |  0.81    Ca    9.1      05 Dec 2019 07:18  Phos  2.9     12-05  Mg     2.8     12-05    TPro  7.4  /  Alb  3.2<L>  /  TBili  0.8  /  DBili  x   /  AST  20  /  ALT  53  /  AlkPhos  124<H>  12-05    Creatinine Trend: 0.81<--, 0.98<--                        13.3   8.90  )-----------( 402      ( 05 Dec 2019 07:18 )             40.6     PT/INR - ( 04 Dec 2019 21:41 )   PT: 12.1 sec;   INR: 1.09 ratio         PTT - ( 04 Dec 2019 21:41 )  PTT:29.0 sec    Lipid Panel: Cholesterol, Serum 129  Direct LDL 69  HDL Cholesterol, Serum 44  Triglycerides, Serum 82    Cardiac Enzymes: CARDIAC MARKERS ( 05 Dec 2019 07:18 )  <0.015 ng/mL / x     / 149 U/L / x     / 2.6 ng/mL  CARDIAC MARKERS ( 04 Dec 2019 21:41 )  <0.015 ng/mL / x     / x     / x     / x        Serum Pro-Brain Natriuretic Peptide: 16 pg/mL (12-04-19 @ 21:41)    RADIOLOGY: CXR 12/4: Clear lung fields, no pleural effusiuons (my interpretation)    ECG [my interpretation]:    TELEMETRY:    ECHO:     STRESS TEST:    CATHETERIZATION: CHIEF COMPLAINT: Chest pain    HPI: 65 yo M with HTN, HLD, and CVA who presented with chest pain. Patient reports     PAST MEDICAL & SURGICAL HISTORY:  as above also H/O carotid artery stenosis (50% LICA, 75% DALIA) noted via CT angiogram    Allergies    No Known Allergies    MEDICATIONS  (STANDING):  aspirin  chewable 81 milliGRAM(s) Oral daily  atorvastatin 80 milliGRAM(s) Oral at bedtime  enoxaparin Injectable 40 milliGRAM(s) SubCutaneous daily  influenza   Vaccine 0.5 milliLiter(s) IntraMuscular once  losartan 100 milliGRAM(s) Oral daily  metoprolol tartrate 12.5 milliGRAM(s) Oral two times a day    MEDICATIONS  (PRN):      FAMILY HISTORY:  FH: CAD (coronary artery disease)    No family history of premature coronary artery disease or sudden cardiac death    SOCIAL HISTORY:  Smoking-  Alcohol-  Illicit Drug use-    REVIEW OF SYSTEMS:  Constitutional: [ ] fever, [ ]weight loss,  [ ]fatigue  Eyes: [ ] visual changes  Respiratory: [ ]shortness of breath;  [ ] cough, [ ]wheezing, [ ]chills, [ ]hemoptysis  Cardiovascular: [ ] chest pain, [ ]palpitations, [ ]dizziness,  [ ]leg swelling [ ]syncope  Gastrointestinal: [ ] abdominal pain, [ ]nausea, [ ]vomiting,  [ ]diarrhea   Genitourinary: [ ] dysuria, [ ] hematuria  Neurologic: [ ] headaches [ ] tremors  [ ] weakness [ ] lightheadedness  Skin: [ ] itching, [ ]burning, [ ] rashes  Endocrine: [ ] heat or cold intolerance  Musculoskeletal: [ ] joint pain or swelling; [ ] muscle, back, or extremity pain  Psychiatric: [ ] depression, [ ]anxiety, [ ]mood swings, or [ ]difficulty sleeping  Hematologic: [ ] easy bruising, [ ] bleeding gums       [ x] All others negative	  [ ] Unable to obtain    Vital Signs Last 24 Hrs  T(C): 36.6 (05 Dec 2019 08:07), Max: 36.8 (05 Dec 2019 04:33)  T(F): 97.8 (05 Dec 2019 08:07), Max: 98.3 (05 Dec 2019 04:33)  HR: 80 (05 Dec 2019 08:07) (78 - 96)  BP: 99/67 (05 Dec 2019 08:07) (99/67 - 140/82)  BP(mean): --  RR: 18 (05 Dec 2019 08:07) (16 - 19)  SpO2: 100% (05 Dec 2019 08:07) (97% - 100%)  I&O's Summary      PHYSICAL EXAM:  General: No acute distress  HEENT: EOMI, PERRL  Neck: Supple, No JVD  Lungs: Clear to auscultation bilaterally; No rales or wheezing  Heart: Regular rate and rhythm; No murmurs, rubs, or gallops  Abdomen: Nontender, bowel sounds present  Extremities: No clubbing, cyanosis, or edema  Nervous system:  Alert & Oriented X3, no focal deficits  Psychiatric: Normal affect  Skin: No rashes or lesions      LABS:  12-05    138  |  103  |  12  ----------------------------<  114<H>  3.8   |  30  |  0.81    Ca    9.1      05 Dec 2019 07:18  Phos  2.9     12-05  Mg     2.8     12-05    TPro  7.4  /  Alb  3.2<L>  /  TBili  0.8  /  DBili  x   /  AST  20  /  ALT  53  /  AlkPhos  124<H>  12-05    Creatinine Trend: 0.81<--, 0.98<--                        13.3   8.90  )-----------( 402      ( 05 Dec 2019 07:18 )             40.6     PT/INR - ( 04 Dec 2019 21:41 )   PT: 12.1 sec;   INR: 1.09 ratio         PTT - ( 04 Dec 2019 21:41 )  PTT:29.0 sec    Lipid Panel: Cholesterol, Serum 129  Direct LDL 69  HDL Cholesterol, Serum 44  Triglycerides, Serum 82    Cardiac Enzymes: CARDIAC MARKERS ( 05 Dec 2019 07:18 )  <0.015 ng/mL / x     / 149 U/L / x     / 2.6 ng/mL  CARDIAC MARKERS ( 04 Dec 2019 21:41 )  <0.015 ng/mL / x     / x     / x     / x        Serum Pro-Brain Natriuretic Peptide: 16 pg/mL (12-04-19 @ 21:41)    RADIOLOGY: CXR 12/4: Clear lung fields, no pleural effusiuons (my interpretation)    ECG [my interpretation]: 12/4/2019 @ 20:26: Sinus rhythm, normal axis, normal EKG    TELEMETRY:    ECHO: CHIEF COMPLAINT: Chest pain    HPI: 63 yo M with HTN, HLD, and CVA who presented with chest pain. 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 He felt left sided chest pain, non-radiating, feeling like "bubbles" in his chest, not associated with dyspnea, palpitations, or LH. Symptoms lasted ~ 4 hours prior to resolving. He also felt some leg weakness/gait instability at that time. Currently has no chest pain.     PAST MEDICAL & SURGICAL HISTORY:  as above also H/O carotid artery stenosis (50% LICA, 75% DALIA) noted via CT angiogram    Allergies    No Known Allergies    MEDICATIONS  (STANDING):  aspirin  chewable 81 milliGRAM(s) Oral daily  atorvastatin 80 milliGRAM(s) Oral at bedtime  enoxaparin Injectable 40 milliGRAM(s) SubCutaneous daily  influenza   Vaccine 0.5 milliLiter(s) IntraMuscular once  losartan 100 milliGRAM(s) Oral daily  metoprolol tartrate 12.5 milliGRAM(s) Oral two times a day    MEDICATIONS  (PRN):      FAMILY HISTORY:  FH: CAD (coronary artery disease)    No family history of premature coronary artery disease or sudden cardiac death    SOCIAL HISTORY:  Smoking-20 pack-years, quit 15 years ago  Alcohol-social  Illicit Drug use-denies    REVIEW OF SYSTEMS:  Constitutional: [ ] fever, [ ]weight loss,  [ ]fatigue  Eyes: [ ] visual changes  Respiratory: [ ]shortness of breath;  [ ] cough, [ ]wheezing, [ ]chills, [ ]hemoptysis  Cardiovascular: [ ] chest pain, [ ]palpitations, [ ]dizziness,  [ ]leg swelling [ ]syncope  Gastrointestinal: [ ] abdominal pain, [ ]nausea, [ ]vomiting,  [ ]diarrhea   Genitourinary: [ ] dysuria, [ ] hematuria  Neurologic: [ ] headaches [ ] tremors  [ ] weakness [ ] lightheadedness  Skin: [ ] itching, [ ]burning, [ ] rashes  Endocrine: [ ] heat or cold intolerance  Musculoskeletal: [ ] joint pain or swelling; [ ] muscle, back, or extremity pain  Psychiatric: [ ] depression, [ ]anxiety, [ ]mood swings, or [ ]difficulty sleeping  Hematologic: [ ] easy bruising, [ ] bleeding gums       [ x] All others negative	  [ ] Unable to obtain    Vital Signs Last 24 Hrs  T(C): 36.6 (05 Dec 2019 08:07), Max: 36.8 (05 Dec 2019 04:33)  T(F): 97.8 (05 Dec 2019 08:07), Max: 98.3 (05 Dec 2019 04:33)  HR: 80 (05 Dec 2019 08:07) (78 - 96)  BP: 99/67 (05 Dec 2019 08:07) (99/67 - 140/82)  BP(mean): --  RR: 18 (05 Dec 2019 08:07) (16 - 19)  SpO2: 100% (05 Dec 2019 08:07) (97% - 100%)  I&O's Summary      PHYSICAL EXAM:  General: No acute distress  HEENT: EOMI, PERRL  Neck: Supple, No JVD  Lungs: Clear to auscultation bilaterally; No rales or wheezing  Heart: Regular rate and rhythm; No murmurs, rubs, or gallops  Abdomen: Nontender, bowel sounds present  Extremities: No clubbing, cyanosis, or edema  Nervous system:  Alert & Oriented X3, no focal deficits  Psychiatric: Normal affect  Skin: No rashes or lesions      LABS:  12-05    138  |  103  |  12  ----------------------------<  114<H>  3.8   |  30  |  0.81    Ca    9.1      05 Dec 2019 07:18  Phos  2.9     12-05  Mg     2.8     12-05    TPro  7.4  /  Alb  3.2<L>  /  TBili  0.8  /  DBili  x   /  AST  20  /  ALT  53  /  AlkPhos  124<H>  12-05    Creatinine Trend: 0.81<--, 0.98<--                        13.3   8.90  )-----------( 402      ( 05 Dec 2019 07:18 )             40.6     PT/INR - ( 04 Dec 2019 21:41 )   PT: 12.1 sec;   INR: 1.09 ratio         PTT - ( 04 Dec 2019 21:41 )  PTT:29.0 sec    Lipid Panel: Cholesterol, Serum 129  Direct LDL 69  HDL Cholesterol, Serum 44  Triglycerides, Serum 82    Cardiac Enzymes: CARDIAC MARKERS ( 05 Dec 2019 07:18 )  <0.015 ng/mL / x     / 149 U/L / x     / 2.6 ng/mL  CARDIAC MARKERS ( 04 Dec 2019 21:41 )  <0.015 ng/mL / x     / x     / x     / x        Serum Pro-Brain Natriuretic Peptide: 16 pg/mL (12-04-19 @ 21:41)    RADIOLOGY: CXR 12/4: Clear lung fields, no pleural effusions (my interpretation)    ECG [my interpretation]: 12/4/2019 @ 20:26: Sinus rhythm, normal axis, normal EKG    TELEMETRY: Sinus rhythm, no events    ECHO:  Pending    STRESS: < from: Nuclear Stress Test-Exercise (12.05.19 @ 12:25) >  IMPRESSIONS:  * Exercise capacity: 9 METS, Average for age and gender.  85% of MPHR.  * There is a small, fixed, mild intensity defect in the  apical wall that thickens, consistent with attenuation  artifact.  * Post-stress resting myocardial perfusion gated SPECT  imaging was performed (LVEF > 70%;LVEDV = 89 ml.)  * Negative study for reversible ischemia    < end of copied text >

## 2019-12-06 ENCOUNTER — TRANSCRIPTION ENCOUNTER (OUTPATIENT)
Age: 64
End: 2019-12-06

## 2019-12-06 VITALS
DIASTOLIC BLOOD PRESSURE: 74 MMHG | RESPIRATION RATE: 18 BRPM | SYSTOLIC BLOOD PRESSURE: 159 MMHG | OXYGEN SATURATION: 100 % | HEART RATE: 87 BPM | TEMPERATURE: 98 F

## 2019-12-06 LAB
ANION GAP SERPL CALC-SCNC: 6 MMOL/L — SIGNIFICANT CHANGE UP (ref 5–17)
ANION GAP SERPL CALC-SCNC: 8 MMOL/L — SIGNIFICANT CHANGE UP (ref 5–17)
ANISOCYTOSIS BLD QL: SLIGHT — SIGNIFICANT CHANGE UP
BASOPHILS # BLD AUTO: 0.1 K/UL — SIGNIFICANT CHANGE UP (ref 0–0.2)
BASOPHILS NFR BLD AUTO: 1 % — SIGNIFICANT CHANGE UP (ref 0–2)
BUN SERPL-MCNC: 34 MG/DL — HIGH (ref 7–18)
BUN SERPL-MCNC: 36 MG/DL — HIGH (ref 7–18)
CALCIUM SERPL-MCNC: 8.8 MG/DL — SIGNIFICANT CHANGE UP (ref 8.4–10.5)
CALCIUM SERPL-MCNC: 8.9 MG/DL — SIGNIFICANT CHANGE UP (ref 8.4–10.5)
CHLORIDE SERPL-SCNC: 108 MMOL/L — SIGNIFICANT CHANGE UP (ref 96–108)
CHLORIDE SERPL-SCNC: 108 MMOL/L — SIGNIFICANT CHANGE UP (ref 96–108)
CK MB BLD-MCNC: 1.5 % — SIGNIFICANT CHANGE UP (ref 0–3.5)
CK MB CFR SERPL CALC: 1.4 NG/ML — SIGNIFICANT CHANGE UP (ref 0–3.6)
CK SERPL-CCNC: 96 U/L — SIGNIFICANT CHANGE UP (ref 35–232)
CO2 SERPL-SCNC: 24 MMOL/L — SIGNIFICANT CHANGE UP (ref 22–31)
CO2 SERPL-SCNC: 25 MMOL/L — SIGNIFICANT CHANGE UP (ref 22–31)
CREAT SERPL-MCNC: 2.05 MG/DL — HIGH (ref 0.5–1.3)
CREAT SERPL-MCNC: 2.05 MG/DL — HIGH (ref 0.5–1.3)
EOSINOPHIL # BLD AUTO: 0.1 K/UL — SIGNIFICANT CHANGE UP (ref 0–0.5)
EOSINOPHIL NFR BLD AUTO: 1 % — SIGNIFICANT CHANGE UP (ref 0–6)
GLUCOSE SERPL-MCNC: 129 MG/DL — HIGH (ref 70–99)
GLUCOSE SERPL-MCNC: 154 MG/DL — HIGH (ref 70–99)
HCT VFR BLD CALC: 39.6 % — SIGNIFICANT CHANGE UP (ref 39–50)
HGB BLD-MCNC: 13 G/DL — SIGNIFICANT CHANGE UP (ref 13–17)
HYPOCHROMIA BLD QL: SLIGHT — SIGNIFICANT CHANGE UP
LYMPHOCYTES # BLD AUTO: 1.8 K/UL — SIGNIFICANT CHANGE UP (ref 1–3.3)
LYMPHOCYTES # BLD AUTO: 18 % — SIGNIFICANT CHANGE UP (ref 13–44)
MANUAL SMEAR VERIFICATION: SIGNIFICANT CHANGE UP
MCHC RBC-ENTMCNC: 27.1 PG — SIGNIFICANT CHANGE UP (ref 27–34)
MCHC RBC-ENTMCNC: 32.8 GM/DL — SIGNIFICANT CHANGE UP (ref 32–36)
MCV RBC AUTO: 82.5 FL — SIGNIFICANT CHANGE UP (ref 80–100)
MONOCYTES # BLD AUTO: 0.8 K/UL — SIGNIFICANT CHANGE UP (ref 0–0.9)
MONOCYTES NFR BLD AUTO: 8 % — SIGNIFICANT CHANGE UP (ref 2–14)
NEUTROPHILS # BLD AUTO: 7.09 K/UL — SIGNIFICANT CHANGE UP (ref 1.8–7.4)
NEUTROPHILS NFR BLD AUTO: 71 % — SIGNIFICANT CHANGE UP (ref 43–77)
NRBC # BLD: 0 /100 — SIGNIFICANT CHANGE UP (ref 0–0)
PLAT MORPH BLD: NORMAL — SIGNIFICANT CHANGE UP
PLATELET # BLD AUTO: 424 K/UL — HIGH (ref 150–400)
PLATELET COUNT - ESTIMATE: NORMAL — SIGNIFICANT CHANGE UP
POIKILOCYTOSIS BLD QL AUTO: SLIGHT — SIGNIFICANT CHANGE UP
POLYCHROMASIA BLD QL SMEAR: SLIGHT — SIGNIFICANT CHANGE UP
POTASSIUM SERPL-MCNC: 3.6 MMOL/L — SIGNIFICANT CHANGE UP (ref 3.5–5.3)
POTASSIUM SERPL-MCNC: 3.7 MMOL/L — SIGNIFICANT CHANGE UP (ref 3.5–5.3)
POTASSIUM SERPL-SCNC: 3.6 MMOL/L — SIGNIFICANT CHANGE UP (ref 3.5–5.3)
POTASSIUM SERPL-SCNC: 3.7 MMOL/L — SIGNIFICANT CHANGE UP (ref 3.5–5.3)
RBC # BLD: 4.8 M/UL — SIGNIFICANT CHANGE UP (ref 4.2–5.8)
RBC # FLD: 15.1 % — HIGH (ref 10.3–14.5)
RBC BLD AUTO: ABNORMAL
SODIUM SERPL-SCNC: 139 MMOL/L — SIGNIFICANT CHANGE UP (ref 135–145)
SODIUM SERPL-SCNC: 140 MMOL/L — SIGNIFICANT CHANGE UP (ref 135–145)
TROPONIN I SERPL-MCNC: <0.015 NG/ML — SIGNIFICANT CHANGE UP (ref 0–0.04)
VARIANT LYMPHS # BLD: 1 % — SIGNIFICANT CHANGE UP (ref 0–6)
WBC # BLD: 9.98 K/UL — SIGNIFICANT CHANGE UP (ref 3.8–10.5)
WBC # FLD AUTO: 9.98 K/UL — SIGNIFICANT CHANGE UP (ref 3.8–10.5)

## 2019-12-06 PROCEDURE — 99232 SBSQ HOSP IP/OBS MODERATE 35: CPT | Mod: GC

## 2019-12-06 PROCEDURE — 85610 PROTHROMBIN TIME: CPT

## 2019-12-06 PROCEDURE — A9502: CPT

## 2019-12-06 PROCEDURE — 78452 HT MUSCLE IMAGE SPECT MULT: CPT

## 2019-12-06 PROCEDURE — 84443 ASSAY THYROID STIM HORMONE: CPT

## 2019-12-06 PROCEDURE — 82607 VITAMIN B-12: CPT

## 2019-12-06 PROCEDURE — 93306 TTE W/DOPPLER COMPLETE: CPT

## 2019-12-06 PROCEDURE — 80053 COMPREHEN METABOLIC PANEL: CPT

## 2019-12-06 PROCEDURE — 86803 HEPATITIS C AB TEST: CPT

## 2019-12-06 PROCEDURE — 93017 CV STRESS TEST TRACING ONLY: CPT

## 2019-12-06 PROCEDURE — 82553 CREATINE MB FRACTION: CPT

## 2019-12-06 PROCEDURE — 83735 ASSAY OF MAGNESIUM: CPT

## 2019-12-06 PROCEDURE — 84484 ASSAY OF TROPONIN QUANT: CPT

## 2019-12-06 PROCEDURE — 82746 ASSAY OF FOLIC ACID SERUM: CPT

## 2019-12-06 PROCEDURE — 85730 THROMBOPLASTIN TIME PARTIAL: CPT

## 2019-12-06 PROCEDURE — 99285 EMERGENCY DEPT VISIT HI MDM: CPT | Mod: 25

## 2019-12-06 PROCEDURE — 71046 X-RAY EXAM CHEST 2 VIEWS: CPT

## 2019-12-06 PROCEDURE — 83880 ASSAY OF NATRIURETIC PEPTIDE: CPT

## 2019-12-06 PROCEDURE — 83036 HEMOGLOBIN GLYCOSYLATED A1C: CPT

## 2019-12-06 PROCEDURE — 82550 ASSAY OF CK (CPK): CPT

## 2019-12-06 PROCEDURE — 80048 BASIC METABOLIC PNL TOTAL CA: CPT

## 2019-12-06 PROCEDURE — 80061 LIPID PANEL: CPT

## 2019-12-06 PROCEDURE — 84100 ASSAY OF PHOSPHORUS: CPT

## 2019-12-06 PROCEDURE — 93005 ELECTROCARDIOGRAM TRACING: CPT

## 2019-12-06 PROCEDURE — 36415 COLL VENOUS BLD VENIPUNCTURE: CPT

## 2019-12-06 PROCEDURE — 85027 COMPLETE CBC AUTOMATED: CPT

## 2019-12-06 RX ORDER — SODIUM CHLORIDE 9 MG/ML
1000 INJECTION INTRAMUSCULAR; INTRAVENOUS; SUBCUTANEOUS ONCE
Refills: 0 | Status: COMPLETED | OUTPATIENT
Start: 2019-12-06 | End: 2019-12-06

## 2019-12-06 RX ORDER — CLOPIDOGREL BISULFATE 75 MG/1
1 TABLET, FILM COATED ORAL
Qty: 30 | Refills: 0
Start: 2019-12-06 | End: 2020-01-04

## 2019-12-06 RX ADMIN — Medication 81 MILLIGRAM(S): at 11:17

## 2019-12-06 RX ADMIN — CLOPIDOGREL BISULFATE 75 MILLIGRAM(S): 75 TABLET, FILM COATED ORAL at 11:17

## 2019-12-06 RX ADMIN — LOSARTAN POTASSIUM 100 MILLIGRAM(S): 100 TABLET, FILM COATED ORAL at 05:02

## 2019-12-06 RX ADMIN — Medication 12.5 MILLIGRAM(S): at 05:02

## 2019-12-06 RX ADMIN — SODIUM CHLORIDE 1000 MILLILITER(S): 9 INJECTION INTRAMUSCULAR; INTRAVENOUS; SUBCUTANEOUS at 14:18

## 2019-12-06 NOTE — PROGRESS NOTE ADULT - SUBJECTIVE AND OBJECTIVE BOX
Patient is a 64y old  Male who presents with a chief complaint of chest pain (05 Dec 2019 16:35)      INTERVAL HPI/OVERNIGHT EVENTS: seen and examined. No complains, chest pain resolved   Stress test negative for ischemia    Serum cr. went up from 0.9 to 2.     MEDICATIONS  (STANDING):  aspirin  chewable 81 milliGRAM(s) Oral daily  atorvastatin 80 milliGRAM(s) Oral at bedtime  clopidogrel Tablet 75 milliGRAM(s) Oral daily  enoxaparin Injectable 40 milliGRAM(s) SubCutaneous daily  losartan 100 milliGRAM(s) Oral daily  metoprolol tartrate 12.5 milliGRAM(s) Oral two times a day    MEDICATIONS  (PRN):      Allergies    No Known Allergies    Intolerances        REVIEW OF SYSTEMS:  CONSTITUTIONAL: No fever, weight loss, or fatigue  RESPIRATORY: No cough, wheezing, chills or hemoptysis; No shortness of breath  CARDIOVASCULAR: No chest pain, palpitations, dizziness, or leg swelling  GASTROINTESTINAL: No abdominal or epigastric pain. No nausea, vomiting, or hematemesis; No diarrhea or constipation. No melena or hematochezia.  NEUROLOGICAL: No headaches, memory loss, loss of strength, numbness, or tremors  MUSCULOSKELETAL: No joint pain or swelling; No muscle, back, or extremity pain      Vital Signs Last 24 Hrs  T(C): 36.7 (06 Dec 2019 11:49), Max: 37.3 (05 Dec 2019 15:39)  T(F): 98 (06 Dec 2019 11:49), Max: 99.2 (05 Dec 2019 15:39)  HR: 80 (06 Dec 2019 11:49) (76 - 91)  BP: 123/69 (06 Dec 2019 11:49) (123/69 - 151/76)  BP(mean): --  RR: 18 (06 Dec 2019 11:49) (18 - 18)  SpO2: 100% (06 Dec 2019 11:49) (95% - 100%)    PHYSICAL EXAM:  GENERAL: NAD, well-groomed, well-developed  HEAD:  Atraumatic, Normocephalic  EYES: EOMI, PERRLA, conjunctiva and sclera clear  NECK: Supple, No JVD, Normal thyroid  NERVOUS SYSTEM:  Alert & Oriented X3, No gross focal deficits  CHEST/LUNG: Clear to percussion bilaterally; No rales, rhonchi, wheezing, or rubs  HEART: Regular rate and rhythm; No murmurs, rubs, or gallops  ABDOMEN: Soft, Nontender, Nondistended; Bowel sounds present  EXTREMITIES:  No clubbing, cyanosis, or edema  SKIN: No rashes or lesions    LABS:                        13.0   9.98  )-----------( 424      ( 06 Dec 2019 07:03 )             39.6     12-06    140  |  108  |  34<H>  ----------------------------<  129<H>  3.7   |  24  |  2.05<H>    Ca    8.9      06 Dec 2019 07:03  Phos  2.9     12-05  Mg     2.8     12-05    TPro  7.4  /  Alb  3.2<L>  /  TBili  0.8  /  DBili  x   /  AST  20  /  ALT  53  /  AlkPhos  124<H>  12-05    PT/INR - ( 04 Dec 2019 21:41 )   PT: 12.1 sec;   INR: 1.09 ratio         PTT - ( 04 Dec 2019 21:41 )  PTT:29.0 sec    CAPILLARY BLOOD GLUCOSE          RADIOLOGY & ADDITIONAL TESTS:    Imaging Personally Reviewed:  [ ] YES  [ ] NO    Consultant(s) Notes Reviewed:  [ ] YES  [ ] NO    Care Discussed with Consultants/Other Providers [ ] YES  [ ] NO    Plan of Care discussed with Housestaff [ ]YES [ ] NO

## 2019-12-06 NOTE — PROGRESS NOTE ADULT - PROBLEM SELECTOR PLAN 1
Chest pain resolved   CE x3 negative  Stress test: no ischemic changes   C/w Aspirin, plavix and Atorvastatin   d/c Metroprolol  repeat BMP, elevated cr. can be a lab error

## 2019-12-06 NOTE — PROGRESS NOTE ADULT - ASSESSMENT
63 y/o male admitted to [facility] for left sided chest pain with concern for ACS and unsteady gait. Patient recently had CTA head and neck performed at another hospital for the same symptoms showing atherosclerotic disease, and was told to f/u with vascular outpatient. Will defer repeat imaging as symptoms are unchanged.

## 2019-12-06 NOTE — DISCHARGE NOTE NURSING/CASE MANAGEMENT/SOCIAL WORK - PATIENT PORTAL LINK FT
You can access the FollowMyHealth Patient Portal offered by [facility] by registering at the following website: [email or web address]. By joining [employer]’s FollowMyHealth portal, you will also be able to view your health information using other applications (apps) compatible with our system.

## 2021-08-24 NOTE — PROGRESS NOTE ADULT - PROBLEM SELECTOR PLAN 7
Detail Level: Detailed IMPROVE VTE Individual Risk Assessment          RISK                                                          Points  [  ] Previous VTE                                                3  [  ] Thrombophilia                                             2  [  ] Lower limb paralysis                                   2        (unable to hold up >15 seconds)    [  ] Current Cancer                                             2         (within 6 months)  [ X ] Immobilization > 24 hrs                              1  [  ] ICU/CCU stay > 24 hours                             1  [ X ] Age > 60                                                         1    IMPROVE VTE Score: 2    lovenox subq

## 2024-04-09 NOTE — ED ADULT TRIAGE NOTE - BP NONINVASIVE DIASTOLIC (MM HG)
Patient unable to be found and left before evaluation complete
breathing is unlabored without accessory muscle use. , normal breath sounds.
82

## 2025-06-30 NOTE — H&P ADULT - PROBLEM SELECTOR PLAN 2
"Anesthesia Transfer of Care Note    Patient: Alonzo Rajput    Procedure(s) Performed: Procedure(s) (LRB):  CIRCUMCISION (N/A)    Patient location: PACU    Anesthesia Type: general    Transport from OR: Transported from OR on room air with adequate spontaneous ventilation    Post pain: adequate analgesia    Post assessment: no apparent anesthetic complications    Post vital signs: stable    Level of consciousness: responds to stimulation    Nausea/Vomiting: no nausea/vomiting    Complications: none    Transfer of care protocol was followed      Last vitals: Visit Vitals  /76 (BP Location: Right arm, Patient Position: Sitting)   Pulse 64   Temp 37 °C (98.6 °F) (Tympanic)   Resp 18   Ht 5' 9" (1.753 m)   Wt 77 kg (169 lb 12.1 oz)   SpO2 97%   BMI 25.07 kg/m²     " Chronic condition, improving  -Continue taking fosamax 70 mg weekly  Continue vitamin D and calcium supplementation and weightbearing exercises     K+ on admission 3.2  may be 2/2 to diuretic use  holding hctz  s/p 40mEq replacement  f/u bmp in am